# Patient Record
Sex: FEMALE | Race: WHITE | NOT HISPANIC OR LATINO | URBAN - METROPOLITAN AREA
[De-identification: names, ages, dates, MRNs, and addresses within clinical notes are randomized per-mention and may not be internally consistent; named-entity substitution may affect disease eponyms.]

---

## 2019-05-06 ENCOUNTER — OFFICE VISIT (OUTPATIENT)
Dept: URBAN - METROPOLITAN AREA CLINIC 17 | Facility: CLINIC | Age: 79
End: 2019-05-06
Payer: COMMERCIAL

## 2019-05-06 DIAGNOSIS — E11.9 TYPE 2 DIABETES MELLITUS W/O COMPLICATION: Primary | ICD-10-CM

## 2019-05-06 DIAGNOSIS — Z96.1 PRESENCE OF INTRAOCULAR LENS: ICD-10-CM

## 2019-05-06 PROCEDURE — 99204 OFFICE O/P NEW MOD 45 MIN: CPT | Performed by: OPTOMETRIST

## 2019-05-06 ASSESSMENT — INTRAOCULAR PRESSURE
OD: 18
OS: 18

## 2019-05-06 NOTE — IMPRESSION/PLAN
Impression: Type 2 diabetes mellitus w/o complication: P32.7. Plan: Diabetes type II: no background retinopathy, no signs of neovascularization noted. Discussed ocular and systemic benefits of blood sugar control.

## 2019-06-24 ENCOUNTER — OFFICE VISIT (OUTPATIENT)
Dept: URBAN - METROPOLITAN AREA CLINIC 17 | Facility: CLINIC | Age: 79
End: 2019-06-24
Payer: COMMERCIAL

## 2019-06-24 DIAGNOSIS — H16.223 KERATOCONJUNCTIVITIS SICCA, NOT SPECIFIED AS SJÖGREN'S, BILATERAL: ICD-10-CM

## 2019-06-24 DIAGNOSIS — H04.123 DRY EYE SYNDROME: Primary | ICD-10-CM

## 2019-06-24 DIAGNOSIS — S05.00XA CORNEAL ABRASION W/O FB OF EYE, INITIAL ENCOUNTER: ICD-10-CM

## 2019-06-24 PROCEDURE — 99213 OFFICE O/P EST LOW 20 MIN: CPT | Performed by: OPTOMETRIST

## 2019-06-24 RX ORDER — LOTEPREDNOL ETABONATE AND TOBRAMYCIN 5; 3 MG/ML; MG/ML
SUSPENSION/ DROPS OPHTHALMIC
Qty: 5 | Refills: 6 | Status: ACTIVE
Start: 2019-06-24

## 2019-06-24 ASSESSMENT — INTRAOCULAR PRESSURE
OS: 24
OD: 23

## 2019-06-24 NOTE — IMPRESSION/PLAN
Impression: Corneal abrasion w/o FB of eye, initial encounter: S05.00xA.  Plan: AT PRN zylet BID OD AT PRN

## 2019-06-24 NOTE — IMPRESSION/PLAN
Impression: Dry Eye Syndrome: I56.392. Plan: Dry eyes account for the patient's complaints. There is no evidence of permanent changes to the cornea. Explained condition does not have a cure and will need artificial tears for maintenance.

## 2024-10-17 ENCOUNTER — OFFICE VISIT (OUTPATIENT)
Dept: URBAN - METROPOLITAN AREA CLINIC 17 | Facility: CLINIC | Age: 84
End: 2024-10-17
Payer: COMMERCIAL

## 2024-10-17 DIAGNOSIS — E11.9 TYPE 2 DIABETES MELLITUS W/O COMPLICATION: Primary | ICD-10-CM

## 2024-10-17 DIAGNOSIS — Z96.1 PRESENCE OF PSEUDOPHAKIA: ICD-10-CM

## 2024-10-17 DIAGNOSIS — H43.813 VITREOUS DEGENERATION, BILATERAL: ICD-10-CM

## 2024-10-17 DIAGNOSIS — H35.3131 NONEXUDATIVE AGE-RELATED MACULAR DEGENERATION, BILATERAL, EARLY DRY STAGE: ICD-10-CM

## 2024-10-17 PROCEDURE — 92134 CPTRZ OPH DX IMG PST SGM RTA: CPT | Performed by: OPTOMETRIST

## 2024-10-17 PROCEDURE — 92004 COMPRE OPH EXAM NEW PT 1/>: CPT | Performed by: OPTOMETRIST

## 2024-10-17 ASSESSMENT — INTRAOCULAR PRESSURE
OD: 17
OS: 16